# Patient Record
Sex: FEMALE | Race: WHITE | NOT HISPANIC OR LATINO | Employment: FULL TIME | URBAN - NONMETROPOLITAN AREA
[De-identification: names, ages, dates, MRNs, and addresses within clinical notes are randomized per-mention and may not be internally consistent; named-entity substitution may affect disease eponyms.]

---

## 2024-05-07 ENCOUNTER — OFFICE VISIT (OUTPATIENT)
Dept: ENDOCRINOLOGY | Facility: CLINIC | Age: 59
End: 2024-05-07
Payer: COMMERCIAL

## 2024-05-07 VITALS
DIASTOLIC BLOOD PRESSURE: 90 MMHG | HEIGHT: 63 IN | BODY MASS INDEX: 32.64 KG/M2 | HEART RATE: 85 BPM | SYSTOLIC BLOOD PRESSURE: 142 MMHG | WEIGHT: 184.2 LBS | OXYGEN SATURATION: 97 %

## 2024-05-07 DIAGNOSIS — E04.1 SOLITARY THYROID NODULE: Primary | ICD-10-CM

## 2024-05-07 PROBLEM — E06.3 HASHIMOTO'S DISEASE: Status: RESOLVED | Noted: 2024-05-07 | Resolved: 2024-05-07

## 2024-05-07 PROBLEM — E06.3 HASHIMOTO'S DISEASE: Status: ACTIVE | Noted: 2024-05-07

## 2024-05-07 LAB
T4 FREE SERPL-MCNC: 1.15 NG/DL (ref 0.93–1.7)
TSH SERPL DL<=0.05 MIU/L-ACNC: 1.94 UIU/ML (ref 0.27–4.2)

## 2024-05-07 PROCEDURE — 84443 ASSAY THYROID STIM HORMONE: CPT | Performed by: NURSE PRACTITIONER

## 2024-05-07 PROCEDURE — 86376 MICROSOMAL ANTIBODY EACH: CPT | Performed by: NURSE PRACTITIONER

## 2024-05-07 PROCEDURE — 99203 OFFICE O/P NEW LOW 30 MIN: CPT | Performed by: NURSE PRACTITIONER

## 2024-05-07 PROCEDURE — 84439 ASSAY OF FREE THYROXINE: CPT | Performed by: NURSE PRACTITIONER

## 2024-05-07 PROCEDURE — 86800 THYROGLOBULIN ANTIBODY: CPT | Performed by: NURSE PRACTITIONER

## 2024-05-07 RX ORDER — AMLODIPINE BESYLATE 5 MG/1
TABLET ORAL
COMMUNITY
Start: 2024-02-06

## 2024-05-07 RX ORDER — GUAIFENESIN 600 MG/1
1200 TABLET, EXTENDED RELEASE ORAL
COMMUNITY

## 2024-05-08 LAB
THYROGLOB AB SERPL-ACNC: <1 IU/ML (ref 0–0.9)
THYROPEROXIDASE AB SERPL-ACNC: 62 IU/ML (ref 0–34)

## 2024-05-13 RX ORDER — LEVOTHYROXINE SODIUM 0.03 MG/1
25 TABLET ORAL DAILY
Qty: 30 TABLET | Refills: 2 | Status: SHIPPED | OUTPATIENT
Start: 2024-05-13

## 2024-08-12 RX ORDER — LEVOTHYROXINE SODIUM 0.03 MG/1
25 TABLET ORAL DAILY
Qty: 30 TABLET | Refills: 2 | Status: SHIPPED | OUTPATIENT
Start: 2024-08-12

## 2024-09-09 ENCOUNTER — OFFICE VISIT (OUTPATIENT)
Dept: ENDOCRINOLOGY | Facility: CLINIC | Age: 59
End: 2024-09-09
Payer: COMMERCIAL

## 2024-09-09 VITALS
DIASTOLIC BLOOD PRESSURE: 95 MMHG | WEIGHT: 182.6 LBS | SYSTOLIC BLOOD PRESSURE: 181 MMHG | HEART RATE: 77 BPM | BODY MASS INDEX: 32.35 KG/M2

## 2024-09-09 DIAGNOSIS — E04.1 SOLITARY THYROID NODULE: Primary | ICD-10-CM

## 2024-09-09 LAB
T4 FREE SERPL-MCNC: 1.17 NG/DL (ref 0.92–1.68)
TSH SERPL DL<=0.05 MIU/L-ACNC: 1.89 UIU/ML (ref 0.27–4.2)

## 2024-09-09 PROCEDURE — 84443 ASSAY THYROID STIM HORMONE: CPT | Performed by: NURSE PRACTITIONER

## 2024-09-09 PROCEDURE — 84439 ASSAY OF FREE THYROXINE: CPT | Performed by: NURSE PRACTITIONER

## 2024-09-09 PROCEDURE — 99213 OFFICE O/P EST LOW 20 MIN: CPT | Performed by: NURSE PRACTITIONER

## 2024-09-09 NOTE — PROGRESS NOTES
Chief Complaint   Patient presents with    Follow-up        Referring Provider  No ref. provider found     ESTHELA Page is a 59 y.o. female had concerns including Follow-up.   Thyroid Nodule.    She reports that she is overall doing well.  Denies any changes to her health recently.  She does report that she has days where her dysphagia and discomfort in her neck is worse than others.  She is currently taking T4 25 mcg QD.  She is taking this regularly without missing doses.  Denies any changes to her neck.  She is not taking any conflicting medication.  Updated US Thyroid: 08/29/2024  FINDINGS: The right lobe of the thyroid measures 42 x 26 x 23 mm. It is  heterogeneous in echogenicity.   Left lobe of the thyroid measures 42 x 15 x 18 mm. It is heterogeneous in echogenicity. There is a 10 mm TR 4 thyroid nodule within the mid pole of the left lobe.   Impression: 10 mm TR 4 left lobe nodule. Recommend 6 month follow-up      History:  She went for her annual exam.  She was noted to have bulging of her right carotid vein and did an US Thyroid due to thickness to the thyroid gland.  Was noted to have thyroid nodules. She can feel a lump when she swallows at times.    Birth state: [place]  Previous history of radiation to face/neck: none  Consuming foods high in iodine: none  Family history of thyroid complications: mother, maternal aunt, maternal 1st cousins, maternal nieces-hypothyroidism, maternal great aunt with thyroid cancer, daughter-hypothyroidism.    US Thyroid: 03/2024  FINDINGS:   The right lobe measures 53 x 19 mm. It is homogeneous.   The left lobe measures 42 x 17 mm. There is a 10 mm solid nodule identified in the mid pole.   Impression: 10 mm TR 4 nodule in the left lobe of the thyroid. Six-month   follow-up recommended.  TI-RADS Category 4: Suspicious nodules (5-8% malignancy).     The following portions of the patient's history were reviewed and updated as appropriate: allergies, current medications,  past family history, past medical history, past social history, past surgical history, and problem list.    History reviewed. No pertinent past medical history.  History reviewed. No pertinent surgical history.   History reviewed. No pertinent family history.   Social History     Socioeconomic History    Marital status:    Tobacco Use    Smoking status: Never     Passive exposure: Never    Smokeless tobacco: Never   Substance and Sexual Activity    Alcohol use: Never    Drug use: Never    Sexual activity: Defer      Allergies   Allergen Reactions    Prednisone Mental Status Change and Dizziness      Current Outpatient Medications on File Prior to Visit   Medication Sig Dispense Refill    amLODIPine (NORVASC) 5 MG tablet       guaiFENesin (MUCINEX) 600 MG 12 hr tablet Take 2 tablets by mouth.      levothyroxine (SYNTHROID, LEVOTHROID) 25 MCG tablet TAKE 1 TABLET BY MOUTH DAILY 30 tablet 2     No current facility-administered medications on file prior to visit.      Review of Systems   Constitutional:  Positive for fatigue and unexpected weight gain.        Inability to lose weight.   HENT:  Negative for trouble swallowing.    Eyes:  Positive for blurred vision.   Endocrine: Positive for cold intolerance and heat intolerance.   Skin:  Positive for dry skin.        Hair loss/thinning, brittle nails.   Psychiatric/Behavioral:  Positive for sleep disturbance.    All other systems reviewed and are negative.    BP (!) 181/95 (BP Location: Right arm, Patient Position: Sitting, Cuff Size: Adult)   Pulse 77   Wt 82.8 kg (182 lb 9.6 oz)   BMI 32.35 kg/m²      Physical Exam  Vitals reviewed.   Constitutional:       Appearance: Normal appearance.   Eyes:      Extraocular Movements: Extraocular movements intact.   Neck:      Thyroid: Thyroid mass, thyromegaly and thyroid tenderness present.   Cardiovascular:      Rate and Rhythm: Normal rate and regular rhythm.   Pulmonary:      Effort: Pulmonary effort is normal.       "Breath sounds: Normal breath sounds.   Skin:     General: Skin is warm.   Neurological:      General: No focal deficit present.      Mental Status: She is alert and oriented to person, place, and time.   Psychiatric:         Mood and Affect: Mood normal.         Behavior: Behavior normal.         Thought Content: Thought content normal.         Judgment: Judgment normal.       Labs/Imaging  CMP  Lab Results   Component Value Date    GLUCOSE 121 (H) 09/02/2015    BUN 13 09/02/2015    CREATININE 0.78 09/02/2015    BCR 16.4 09/02/2015    K 3.8 09/02/2015    CO2 28.6 09/02/2015    CALCIUM 10.0 09/02/2015    ALBUMIN 4.4 09/02/2015    LABIL2 1.3 (L) 09/02/2015    AST 29 09/02/2015    ALT 33 09/02/2015        CBC w/DIFF   Lab Results   Component Value Date    WBC 6.7 09/02/2015    RBC 5.21 09/02/2015    HGB 14.3 09/02/2015    HCT 44.1 09/02/2015    MCV 84.6 09/02/2015    MCH 27.4 09/02/2015    MCHC 32.4 (L) 09/02/2015    RDW 14.7 (H) 09/02/2015    MPV 9.7 09/02/2015     09/02/2015    NEUTRORELPCT 59.5 09/02/2015    LYMPHORELPCT 29.6 09/02/2015    MONORELPCT 9.1 09/02/2015    EOSRELPCT 1.0 09/02/2015    BASORELPCT 0.7 09/02/2015    NEUTROABS 4.0 09/02/2015    LYMPHSABS 2.0 09/02/2015    MONOSABS 0.6 09/02/2015    EOSABS 0.1 09/02/2015    BASOSABS 0.1 09/02/2015       TSH  Lab Results   Component Value Date    TSH 1.940 05/07/2024       T4  Lab Results   Component Value Date    FREET4 1.15 05/07/2024     No results found for: \"W9GXNHA\"    T3  No results found for: \"T3FREE\"  No results found for: \"D7PYXUL\"    TRAb  No results found for: \"TSURCPAB\"    TPO  Lab Results   Component Value Date    THYROIDAB 62 (H) 05/07/2024       No valid procedures specified.    Assessment and Plan    Diagnoses and all orders for this visit:    1. Solitary thyroid nodule (Primary)  Assessment & Plan:  -Clinically euthyroid.  -TFT's today with medication adjustment accordingly.  -Reminded of proper administration including taking 7 pills, " once daily, per week on an empty stomach with no missed doses, waiting 30-60 minutes prior to other medications or food.  -Will obtain US Thyroid in 6 months to monitor for nodule changes.  -Follow-up in 6 months.     Orders:  -     TSH  -     T4, free           Return in about 6 months (around 3/9/2025) for Follow-up appointment. The patient was instructed to contact the clinic with any interval questions or concerns.      This document has been electronically signed by SHANTAL Whitten  September 9, 2024 09:39 EDT   Endocrinology    Please note that portions of this document were completed with a voice recognition program. Efforts were made to edit the dictations, but occasionally words are mis-transcribed.

## 2024-09-09 NOTE — ASSESSMENT & PLAN NOTE
-Clinically euthyroid.  -TFT's today with medication adjustment accordingly.  -Reminded of proper administration including taking 7 pills, once daily, per week on an empty stomach with no missed doses, waiting 30-60 minutes prior to other medications or food.  -Will obtain US Thyroid in 6 months to monitor for nodule changes.  -Follow-up in 6 months.

## 2024-10-17 RX ORDER — LEVOTHYROXINE SODIUM 25 UG/1
25 TABLET ORAL DAILY
Qty: 30 TABLET | Refills: 2 | Status: SHIPPED | OUTPATIENT
Start: 2024-10-17

## 2025-02-12 RX ORDER — LEVOTHYROXINE SODIUM 25 UG/1
25 TABLET ORAL DAILY
Qty: 30 TABLET | Refills: 0 | Status: SHIPPED | OUTPATIENT
Start: 2025-02-12 | End: 2025-02-13 | Stop reason: SDUPTHER

## 2025-02-13 RX ORDER — LEVOTHYROXINE SODIUM 25 UG/1
25 TABLET ORAL DAILY
Qty: 90 TABLET | Refills: 1 | Status: SHIPPED | OUTPATIENT
Start: 2025-02-13

## 2025-03-10 ENCOUNTER — OFFICE VISIT (OUTPATIENT)
Dept: ENDOCRINOLOGY | Facility: CLINIC | Age: 60
End: 2025-03-10
Payer: COMMERCIAL

## 2025-03-10 VITALS
WEIGHT: 185.8 LBS | OXYGEN SATURATION: 97 % | DIASTOLIC BLOOD PRESSURE: 81 MMHG | HEART RATE: 85 BPM | BODY MASS INDEX: 32.91 KG/M2 | SYSTOLIC BLOOD PRESSURE: 162 MMHG

## 2025-03-10 DIAGNOSIS — E04.1 SOLITARY THYROID NODULE: Primary | ICD-10-CM

## 2025-03-10 NOTE — PROGRESS NOTES
Chief Complaint   Patient presents with    Follow-up     Solitary thyroid nodule        Referring Provider  No ref. provider found     HPI   Purvi Page is a 59 y.o. female had concerns including Follow-up (Solitary thyroid nodule).   Thyroid Nodule.    She reports that she went to her PCP for her regular visit and they completed a new updated US thyroid.  Results showed the following:  US Thyroid: 02/10/2025  FINDINGS: The right lobe of the thyroid measures 4.5 x 1.2 x 2.0 cm. It is normal in echogenicity.   Left lobe of the thyroid measures 4.1 x 1.5 x 1.6 cm. There is a 10 mm TR 5 lower pole left thyroid nodule.   Impression:  10 mm TR 5 lower pole left thyroid nodule. Biopsy is recommended.  Her PCP scheduled her to have a biopsy completed at the end of the month and scheduled her to see Dr Ulrich at  for surgical intervention, but wanted her to be seen here today as well.     History:  She reports that she is overall doing well.  Denies any changes to her health recently.  She does report that she has days where her dysphagia and discomfort in her neck is worse than others.  She is currently taking T4 25 mcg QD.  She is taking this regularly without missing doses.  Denies any changes to her neck.  She is not taking any conflicting medication.  Updated US Thyroid: 08/29/2024  FINDINGS: The right lobe of the thyroid measures 42 x 26 x 23 mm. It is  heterogeneous in echogenicity.   Left lobe of the thyroid measures 42 x 15 x 18 mm. It is heterogeneous in echogenicity. There is a 10 mm TR 4 thyroid nodule within the mid pole of the left lobe.   Impression: 10 mm TR 4 left lobe nodule. Recommend 6 month follow-up      She went for her annual exam.  She was noted to have bulging of her right carotid vein and did an US Thyroid due to thickness to the thyroid gland.  Was noted to have thyroid nodules. She can feel a lump when she swallows at times.    Birth state: [place]  Previous history of radiation to face/neck:  none  Consuming foods high in iodine: none  Family history of thyroid complications: mother, maternal aunt, maternal 1st cousins, maternal nieces-hypothyroidism, maternal great aunt with thyroid cancer, daughter-hypothyroidism.    US Thyroid: 03/2024  FINDINGS:   The right lobe measures 53 x 19 mm. It is homogeneous.   The left lobe measures 42 x 17 mm. There is a 10 mm solid nodule identified in the mid pole.   Impression: 10 mm TR 4 nodule in the left lobe of the thyroid. Six-month   follow-up recommended.  TI-RADS Category 4: Suspicious nodules (5-8% malignancy).     The following portions of the patient's history were reviewed and updated as appropriate: allergies, current medications, past family history, past medical history, past social history, past surgical history, and problem list.    Past Medical History:   Diagnosis Date    Hashimoto's thyroiditis 5/24    Hypertension 2013    Thyroid nodule 5/24    Vitamin D deficiency 2019     History reviewed. No pertinent surgical history.   Family History   Problem Relation Age of Onset    Hypertension Mother     Thyroid disease Mother         Hypothyroidism    Hypertension Father     Thyroid disease Maternal Aunt         Hypothyroidism      Social History     Socioeconomic History    Marital status:    Tobacco Use    Smoking status: Never     Passive exposure: Never    Smokeless tobacco: Never   Vaping Use    Vaping status: Never Used   Substance and Sexual Activity    Alcohol use: Never    Drug use: Never    Sexual activity: Not Currently     Partners: Male     Birth control/protection: Hysterectomy      Allergies   Allergen Reactions    Prednisone Mental Status Change and Dizziness      Current Outpatient Medications on File Prior to Visit   Medication Sig Dispense Refill    amLODIPine (NORVASC) 5 MG tablet       levothyroxine (SYNTHROID, LEVOTHROID) 25 MCG tablet Take 1 tablet by mouth Daily. 90 tablet 1    guaiFENesin (MUCINEX) 600 MG 12 hr tablet Take 2  tablets by mouth.       No current facility-administered medications on file prior to visit.      Review of Systems   Constitutional:  Positive for fatigue and unexpected weight gain.        Inability to lose weight.   HENT:  Negative for trouble swallowing.    Eyes:  Positive for blurred vision.   Endocrine: Positive for cold intolerance and heat intolerance.   Skin:  Positive for dry skin.        Hair loss/thinning, brittle nails.   Psychiatric/Behavioral:  Positive for sleep disturbance.    All other systems reviewed and are negative.    /81 (BP Location: Right arm, Patient Position: Sitting, Cuff Size: Adult)   Pulse 85   Wt 84.3 kg (185 lb 12.8 oz)   SpO2 97%   BMI 32.91 kg/m²      Physical Exam  Vitals reviewed.   Constitutional:       Appearance: Normal appearance.   Eyes:      Extraocular Movements: Extraocular movements intact.   Neck:      Thyroid: Thyroid mass, thyromegaly and thyroid tenderness present.   Cardiovascular:      Rate and Rhythm: Normal rate and regular rhythm.   Pulmonary:      Effort: Pulmonary effort is normal.      Breath sounds: Normal breath sounds.   Skin:     General: Skin is warm.   Neurological:      General: No focal deficit present.      Mental Status: She is alert and oriented to person, place, and time.   Psychiatric:         Mood and Affect: Mood normal.         Behavior: Behavior normal.         Thought Content: Thought content normal.         Judgment: Judgment normal.       Labs/Imaging  CMP  Lab Results   Component Value Date    GLUCOSE 121 (H) 09/02/2015    BUN 13 09/02/2015    CREATININE 0.78 09/02/2015    BCR 16.4 09/02/2015    K 3.8 09/02/2015    CO2 28.6 09/02/2015    CALCIUM 10.0 09/02/2015    ALBUMIN 4.4 09/02/2015    AST 29 09/02/2015    ALT 33 09/02/2015        CBC w/DIFF   Lab Results   Component Value Date    WBC 6.7 09/02/2015    RBC 5.21 09/02/2015    HGB 14.3 09/02/2015    HCT 44.1 09/02/2015    MCV 84.6 09/02/2015    MCH 27.4 09/02/2015    [facility]  "32.4 (L) 09/02/2015    RDW 14.7 (H) 09/02/2015    MPV 9.7 09/02/2015     09/02/2015    NEUTRORELPCT 59.5 09/02/2015    LYMPHORELPCT 29.6 09/02/2015    MONORELPCT 9.1 09/02/2015    EOSRELPCT 1.0 09/02/2015    BASORELPCT 0.7 09/02/2015    NEUTROABS 4.0 09/02/2015    LYMPHSABS 2.0 09/02/2015    MONOSABS 0.6 09/02/2015    EOSABS 0.1 09/02/2015    BASOSABS 0.1 09/02/2015       TSH  Lab Results   Component Value Date    TSH 1.890 09/09/2024    TSH 1.940 05/07/2024       T4  Lab Results   Component Value Date    FREET4 1.17 09/09/2024    FREET4 1.15 05/07/2024     No results found for: \"R4BAUTZ\"    T3  No results found for: \"T3FREE\"  No results found for: \"I9DLQEC\"    TRAb  No results found for: \"TSURCPAB\"    TPO  Lab Results   Component Value Date    THYROIDAB 62 (H) 05/07/2024       No valid procedures specified.    Assessment and Plan    Diagnoses and all orders for this visit:    1. Solitary thyroid nodule (Primary)  Assessment & Plan:  -Clinically euthyroid.  -We discussed the change in her nodule with T RADS rating changing from T4 to T5 at this ultrasound.  Discussed the importance of getting a biopsy completed to determine if cancer cells are present.  Discussed with patient the option of having this completed at her scheduled biopsy, seeing one of our physicians in [place] to have it completed for scheduling an appointment with Dr. Hoffmann get this completed sooner.  She has a phone appointment with one of the NP's from Dr. Ulrich's office on Wednesday.  She wants to continue with this appointment to determine her course of action.  -Discussed with patient that after that visit if she does not feel like timeline is appropriate, she is welcome to call back and we can expedite things on our side as far as getting a biopsy completed and getting her set up with Dr. Martin for surgical intervention for removal and further testing of thyroid nodule.  -Discussed that she will need to be on higher doses of T4 " therapy after her surgery.  She would like to follow-up with management after the surgery for this here.  Informed that she is welcome to schedule an appointment after her surgery.  -Follow-up in 6 months.                Return in about 6 months (around 9/10/2025) for Follow-up appointment. The patient was instructed to contact the clinic with any interval questions or concerns.      This document has been electronically signed by SHANTAL Whtiten  March 10, 2025 16:31 EDT   Endocrinology    Please note that portions of this document were completed with a voice recognition program. Efforts were made to edit the dictations, but occasionally words are mis-transcribed.

## 2025-03-10 NOTE — ASSESSMENT & PLAN NOTE
-Clinically euthyroid.  -We discussed the change in her nodule with T RADS rating changing from T4 to T5 at this ultrasound.  Discussed the importance of getting a biopsy completed to determine if cancer cells are present.  Discussed with patient the option of having this completed at her scheduled biopsy, seeing one of our physicians in [place] to have it completed for scheduling an appointment with Dr. Hoffmann get this completed sooner.  She has a phone appointment with one of the NP's from Dr. Ulrich's office on Wednesday.  She wants to continue with this appointment to determine her course of action.  -Discussed with patient that after that visit if she does not feel like timeline is appropriate, she is welcome to call back and we can expedite things on our side as far as getting a biopsy completed and getting her set up with Dr. Martin for surgical intervention for removal and further testing of thyroid nodule.  -Discussed that she will need to be on higher doses of T4 therapy after her surgery.  She would like to follow-up with management after the surgery for this here.  Informed that she is welcome to schedule an appointment after her surgery.  -Follow-up in 6 months.

## 2025-03-17 RX ORDER — LEVOTHYROXINE SODIUM 25 UG/1
25 TABLET ORAL DAILY
Qty: 90 TABLET | Refills: 1 | Status: SHIPPED | OUTPATIENT
Start: 2025-03-17